# Patient Record
Sex: FEMALE | Race: BLACK OR AFRICAN AMERICAN | NOT HISPANIC OR LATINO | Employment: FULL TIME | URBAN - METROPOLITAN AREA
[De-identification: names, ages, dates, MRNs, and addresses within clinical notes are randomized per-mention and may not be internally consistent; named-entity substitution may affect disease eponyms.]

---

## 2024-11-17 ENCOUNTER — HOSPITAL ENCOUNTER (EMERGENCY)
Facility: HOSPITAL | Age: 74
Discharge: HOME | End: 2024-11-17
Attending: EMERGENCY MEDICINE

## 2024-11-17 VITALS
HEART RATE: 73 BPM | RESPIRATION RATE: 17 BRPM | SYSTOLIC BLOOD PRESSURE: 136 MMHG | DIASTOLIC BLOOD PRESSURE: 64 MMHG | HEIGHT: 62 IN | OXYGEN SATURATION: 97 % | WEIGHT: 220 LBS | BODY MASS INDEX: 40.48 KG/M2 | TEMPERATURE: 98 F

## 2024-11-17 DIAGNOSIS — R19.7 DIARRHEA, UNSPECIFIED TYPE: Primary | ICD-10-CM

## 2024-11-17 DIAGNOSIS — R10.9 ABDOMINAL PAIN: ICD-10-CM

## 2024-11-17 DIAGNOSIS — E87.6 HYPOKALEMIA: ICD-10-CM

## 2024-11-17 LAB
ALBUMIN SERPL BCP-MCNC: 4 G/DL (ref 3.5–5.2)
ALP SERPL-CCNC: 49 U/L (ref 40–150)
ALT SERPL W/O P-5'-P-CCNC: 16 U/L (ref 10–44)
ANION GAP SERPL CALC-SCNC: 11 MMOL/L (ref 8–16)
AST SERPL-CCNC: 27 U/L (ref 10–40)
BASOPHILS # BLD AUTO: 0.03 K/UL (ref 0–0.2)
BASOPHILS NFR BLD: 0.6 % (ref 0–1.9)
BILIRUB SERPL-MCNC: 0.9 MG/DL (ref 0.1–1)
BUN SERPL-MCNC: 10 MG/DL (ref 8–23)
CALCIUM SERPL-MCNC: 9.7 MG/DL (ref 8.7–10.5)
CHLORIDE SERPL-SCNC: 100 MMOL/L (ref 95–110)
CO2 SERPL-SCNC: 27 MMOL/L (ref 23–29)
CREAT SERPL-MCNC: 1.1 MG/DL (ref 0.5–1.4)
DIFFERENTIAL METHOD BLD: ABNORMAL
EOSINOPHIL # BLD AUTO: 0.2 K/UL (ref 0–0.5)
EOSINOPHIL NFR BLD: 3 % (ref 0–8)
ERYTHROCYTE [DISTWIDTH] IN BLOOD BY AUTOMATED COUNT: 19.5 % (ref 11.5–14.5)
EST. GFR  (NO RACE VARIABLE): 52.7 ML/MIN/1.73 M^2
GLUCOSE SERPL-MCNC: 93 MG/DL (ref 70–110)
HCT VFR BLD AUTO: 32.5 % (ref 37–48.5)
HCV AB SERPL QL IA: NORMAL
HGB BLD-MCNC: 11.2 G/DL (ref 12–16)
HIV 1+2 AB+HIV1 P24 AG SERPL QL IA: NORMAL
IMM GRANULOCYTES # BLD AUTO: 0.05 K/UL (ref 0–0.04)
IMM GRANULOCYTES NFR BLD AUTO: 1 % (ref 0–0.5)
LACTATE SERPL-SCNC: 1.1 MMOL/L (ref 0.5–2.2)
LYMPHOCYTES # BLD AUTO: 1.6 K/UL (ref 1–4.8)
LYMPHOCYTES NFR BLD: 32.2 % (ref 18–48)
MAGNESIUM SERPL-MCNC: 1.5 MG/DL (ref 1.6–2.6)
MCH RBC QN AUTO: 29.9 PG (ref 27–31)
MCHC RBC AUTO-ENTMCNC: 34.5 G/DL (ref 32–36)
MCV RBC AUTO: 87 FL (ref 82–98)
MONOCYTES # BLD AUTO: 0.5 K/UL (ref 0.3–1)
MONOCYTES NFR BLD: 9.6 % (ref 4–15)
NEUTROPHILS # BLD AUTO: 2.7 K/UL (ref 1.8–7.7)
NEUTROPHILS NFR BLD: 53.6 % (ref 38–73)
NRBC BLD-RTO: 0 /100 WBC
PLATELET # BLD AUTO: 214 K/UL (ref 150–450)
PLATELET BLD QL SMEAR: ABNORMAL
PMV BLD AUTO: 10.4 FL (ref 9.2–12.9)
POTASSIUM SERPL-SCNC: 2.8 MMOL/L (ref 3.5–5.1)
PROT SERPL-MCNC: 7.1 G/DL (ref 6–8.4)
RBC # BLD AUTO: 3.74 M/UL (ref 4–5.4)
SODIUM SERPL-SCNC: 138 MMOL/L (ref 136–145)
WBC # BLD AUTO: 5 K/UL (ref 3.9–12.7)

## 2024-11-17 PROCEDURE — 87389 HIV-1 AG W/HIV-1&-2 AB AG IA: CPT

## 2024-11-17 PROCEDURE — 83605 ASSAY OF LACTIC ACID: CPT | Performed by: EMERGENCY MEDICINE

## 2024-11-17 PROCEDURE — 86803 HEPATITIS C AB TEST: CPT

## 2024-11-17 PROCEDURE — 96361 HYDRATE IV INFUSION ADD-ON: CPT

## 2024-11-17 PROCEDURE — 96360 HYDRATION IV INFUSION INIT: CPT

## 2024-11-17 PROCEDURE — 85025 COMPLETE CBC W/AUTO DIFF WBC: CPT | Performed by: EMERGENCY MEDICINE

## 2024-11-17 PROCEDURE — 25000003 PHARM REV CODE 250: Performed by: EMERGENCY MEDICINE

## 2024-11-17 PROCEDURE — 83735 ASSAY OF MAGNESIUM: CPT | Performed by: EMERGENCY MEDICINE

## 2024-11-17 PROCEDURE — 93010 ELECTROCARDIOGRAM REPORT: CPT | Mod: ,,, | Performed by: STUDENT IN AN ORGANIZED HEALTH CARE EDUCATION/TRAINING PROGRAM

## 2024-11-17 PROCEDURE — 93005 ELECTROCARDIOGRAM TRACING: CPT

## 2024-11-17 PROCEDURE — 80053 COMPREHEN METABOLIC PANEL: CPT | Performed by: EMERGENCY MEDICINE

## 2024-11-17 PROCEDURE — 99284 EMERGENCY DEPT VISIT MOD MDM: CPT | Mod: 25

## 2024-11-17 RX ADMIN — SODIUM CHLORIDE 1000 ML: 9 INJECTION, SOLUTION INTRAVENOUS at 06:11

## 2024-11-17 RX ADMIN — POTASSIUM BICARBONATE 40 MEQ: 391 TABLET, EFFERVESCENT ORAL at 09:11

## 2024-11-17 RX ADMIN — POTASSIUM BICARBONATE 20 MEQ: 391 TABLET, EFFERVESCENT ORAL at 09:11

## 2024-11-17 NOTE — ED PROVIDER NOTES
Encounter Date: 11/17/2024       History     Chief Complaint   Patient presents with    Diarrhea     Pt reports diarrhea x6 times per day for about a week and a half. Pt reports abdominal pain.     HPI  72 y/o F with medical history of HTN, asthma and PUD presents with persistent diarrhea for the past 5-6 days. States watery stools with 'foam' mucous about 5-6 times per day.   No abdominal pain  No nausea or vomiting.  No fevers  No recent Abx  No recent travel    No lightheadedness or dizziness.    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 No pertinent surgical history.    No family history on file.  Social History     Tobacco Use    Smoking status: Never     Passive exposure: Never    Smokeless tobacco: Never     Physical Exam     Initial Vitals [11/17/24 1604]   BP Pulse Resp Temp SpO2   (!) 126/58 82 20 97.9 °F (36.6 °C) 99 %      MAP       --         Physical Exam    Nursing note and vitals reviewed.  Constitutional: She appears well-developed and well-nourished. She is not diaphoretic. No distress.   HENT:   Head: Normocephalic and atraumatic.   Eyes: Conjunctivae are normal.   Neck: Neck supple.   Cardiovascular:  Normal rate, regular rhythm, normal heart sounds and intact distal pulses.           No murmur heard.  Pulmonary/Chest: Breath sounds normal. No respiratory distress. She has no wheezes. She has no rales.   Abdominal:   Soft obese, NT and ND   Genitourinary: Rectum:      Guaiac result negative.   Guaiac negative stool.    Genitourinary Comments: Minimal brown stool, no BRB     Musculoskeletal:         General: No tenderness or edema.      Cervical back: Neck supple.     Neurological: GCS score is 15. GCS eye subscore is 4. GCS verbal subscore is 5. GCS motor subscore  is 6.   Skin: Skin is warm and dry.         ED Course   Procedures  Labs Reviewed   CBC W/ AUTO DIFFERENTIAL - Abnormal       Result Value    WBC 5.00      RBC 3.74 (*)     Hemoglobin 11.2 (*)     Hematocrit 32.5 (*)     MCV 87      MCH 29.9      MCHC 34.5      RDW 19.5 (*)     Platelets 214      MPV 10.4      Immature Granulocytes 1.0 (*)     Gran # (ANC) 2.7      Immature Grans (Abs) 0.05 (*)     Lymph # 1.6      Mono # 0.5      Eos # 0.2      Baso # 0.03      nRBC 0      Gran % 53.6      Lymph % 32.2      Mono % 9.6      Eosinophil % 3.0      Basophil % 0.6      Platelet Estimate Appears normal      Differential Method Automated     COMPREHENSIVE METABOLIC PANEL - Abnormal    Sodium 138      Potassium 2.8 (*)     Chloride 100      CO2 27      Glucose 93      BUN 10      Creatinine 1.1      Calcium 9.7      Total Protein 7.1      Albumin 4.0      Total Bilirubin 0.9      Alkaline Phosphatase 49      AST 27      ALT 16      eGFR 52.7 (*)     Anion Gap 11     MAGNESIUM - Abnormal    Magnesium 1.5 (*)    HEPATITIS C ANTIBODY    Hepatitis C Ab Non-reactive      Narrative:     Release to patient->Immediate   HIV 1 / 2 ANTIBODY    HIV 1/2 Ag/Ab Non-reactive      Narrative:     Release to patient->Immediate   LACTIC ACID, PLASMA    Lactate (Lactic Acid) 1.1       EKG Readings: (Independently Interpreted)   Sinus rhythm, rate 71, lbbb, no prior     ECG Results              EKG 12-lead (Final result)        Collection Time Result Time QRS Duration OHS QTC Calculation    11/17/24 18:16:23 11/18/24 21:27:03 126 515                     Final result by Interface, Lab In [facility] (11/18/24 21:27:06)                   Narrative:    Test Reason : R10.9,    Vent. Rate :  71 BPM     Atrial Rate :  71 BPM     P-R Int : 182 ms          QRS Dur : 126 ms      QT Int : 474 ms       P-R-T Axes :  63 -49 111 degrees    QTcB Int : 515 ms    Normal sinus rhythm  Left axis deviation  Left bundle branch block  Abnormal ECG  No previous ECGs  available  Confirmed by Mj Jimenes ([id number]) on 11/18/2024 9:26:58 PM    Referred By: AAAREFERRAL SELF           Confirmed By: Mj Jimenes                                  Imaging Results    None          Medications   sodium chloride 0.9% bolus 1,000 mL 1,000 mL (0 mLs Intravenous Stopped 11/17/24 2103)   potassium bicarbonate disintegrating tablet 40 mEq (40 mEq Oral Given 11/17/24 2158)   potassium bicarbonate disintegrating tablet 20 mEq (20 mEq Oral Given 11/17/24 2158)     Medical Decision Making  74 y/o F with foamy/watery stools for past 6 days. No abdominal tenderness on exam and pt well appearing. Ddx: JOCELYN, dehydration, electrolyte abnormality, possible colitis, IBS, less likely clostridia    9:45 PM  Blood work unremarkable, including negative lactate. Pt not able to provide stool specimen. Discharged to home. Follow up PCP. Routine return precautions.      Amount and/or Complexity of Data Reviewed  Labs: ordered. Decision-making details documented in ED Course.  ECG/medicine tests: ordered and independent interpretation performed. Decision-making details documented in ED Course.    Risk  Prescription drug management.                                      Clinical Impression:  Final diagnoses:  [R10.9] Abdominal pain  [R19.7] Diarrhea, unspecified type (Primary)  [E87.6] Hypokalemia          ED Disposition Condition    Discharge Stable          ED Prescriptions    None       Follow-up Information       Follow up With Specialties Details Why Contact Info Additional Information    [facility] Schedule an appointment as soon as possible for a visit in 5 days  [address]  [facility] [address] [phone] [facility] Please park in surface lot and check in at central registration desk             Gayle Mcdonough MD  11/28/24 [id number]

## 2024-11-17 NOTE — ED TRIAGE NOTES
Amara MAAME Spears, a 73 y.o. female presents to the ED w/ complaint of diarrhea.    Triage note:  Chief Complaint   Patient presents with    Diarrhea     Pt reports diarrhea x6 times per day for about a week and a half. Pt reports abdominal pain.     Review of patient's allergies indicates:   Allergen Reactions    Pcn [penicillins] Anaphylaxis     No past medical history on file.

## 2024-11-18 LAB
OHS QRS DURATION: 126 MS
OHS QTC CALCULATION: 515 MS

## 2024-11-18 NOTE — DISCHARGE INSTRUCTIONS
Drink plenty of fluids to stay hydrated  Follow up with your doctor for stool studies as well as repeat potassium level in 1 week  Return to ED for abdominal pain, vomiting, lightheadedness, dizziness, fevers, persistent diarrhea, bloody stools or any other concerns